# Patient Record
Sex: MALE | Race: BLACK OR AFRICAN AMERICAN | NOT HISPANIC OR LATINO | URBAN - METROPOLITAN AREA
[De-identification: names, ages, dates, MRNs, and addresses within clinical notes are randomized per-mention and may not be internally consistent; named-entity substitution may affect disease eponyms.]

---

## 2020-08-29 ENCOUNTER — EMERGENCY (EMERGENCY)
Facility: HOSPITAL | Age: 30
LOS: 1 days | Discharge: HOME | End: 2020-08-29
Attending: EMERGENCY MEDICINE | Admitting: EMERGENCY MEDICINE
Payer: COMMERCIAL

## 2020-08-29 VITALS
SYSTOLIC BLOOD PRESSURE: 132 MMHG | HEART RATE: 90 BPM | DIASTOLIC BLOOD PRESSURE: 80 MMHG | TEMPERATURE: 98 F | RESPIRATION RATE: 18 BRPM | OXYGEN SATURATION: 97 %

## 2020-08-29 VITALS
HEART RATE: 112 BPM | RESPIRATION RATE: 18 BRPM | DIASTOLIC BLOOD PRESSURE: 86 MMHG | WEIGHT: 315 LBS | SYSTOLIC BLOOD PRESSURE: 147 MMHG | TEMPERATURE: 98 F | OXYGEN SATURATION: 97 %

## 2020-08-29 DIAGNOSIS — Z91.013 ALLERGY TO SEAFOOD: ICD-10-CM

## 2020-08-29 DIAGNOSIS — R07.89 OTHER CHEST PAIN: ICD-10-CM

## 2020-08-29 PROCEDURE — 83880 ASSAY OF NATRIURETIC PEPTIDE: CPT

## 2020-08-29 PROCEDURE — 84484 ASSAY OF TROPONIN QUANT: CPT

## 2020-08-29 PROCEDURE — 85025 COMPLETE CBC W/AUTO DIFF WBC: CPT

## 2020-08-29 PROCEDURE — 36415 COLL VENOUS BLD VENIPUNCTURE: CPT

## 2020-08-29 PROCEDURE — 99283 EMERGENCY DEPT VISIT LOW MDM: CPT | Mod: 25

## 2020-08-29 PROCEDURE — 71045 X-RAY EXAM CHEST 1 VIEW: CPT

## 2020-08-29 PROCEDURE — 71045 X-RAY EXAM CHEST 1 VIEW: CPT | Mod: 26

## 2020-08-29 PROCEDURE — 99285 EMERGENCY DEPT VISIT HI MDM: CPT

## 2020-08-29 PROCEDURE — 80053 COMPREHEN METABOLIC PANEL: CPT

## 2020-08-29 RX ORDER — ASPIRIN/CALCIUM CARB/MAGNESIUM 324 MG
325 TABLET ORAL ONCE
Refills: 0 | Status: COMPLETED | OUTPATIENT
Start: 2020-08-29 | End: 2020-08-29

## 2020-08-29 RX ORDER — NICARDIPINE HYDROCHLORIDE 30 MG/1
30 CAPSULE, EXTENDED RELEASE ORAL ONCE
Refills: 0 | Status: COMPLETED | OUTPATIENT
Start: 2020-08-29 | End: 2020-08-29

## 2020-08-29 RX ADMIN — Medication 325 MILLIGRAM(S): at 18:08

## 2020-08-29 NOTE — ED PROVIDER NOTE - CLINICAL SUMMARY MEDICAL DECISION MAKING FREE TEXT BOX
CP w [facility]- ekg ST, check labs, follow bp and CXR CP w [facility]- ekg ST, check labs, follow bp and CXR  labs noted- slightly bumped trop- recommended admission- but px wants to go home d/w cards- Dr WEIR will see px on MOnday and check echo  si/sx to return to ED d/w px

## 2020-08-29 NOTE — ED ADULT NURSE NOTE - OBJECTIVE STATEMENT
Pt CO CP upon waking this morning with associated AMAYA today.  Pt states "I woke up with a sharp burning pain and I wasn't sure if it was acid pain or not, but I was at the doctors yesterday and my pressure was a little high."  Pt denies any sig PMHx or SHx.  Pt denies N/V/D, Fevers, Dizziness, Cough.

## 2020-08-29 NOTE — ED PROVIDER NOTE - OBJECTIVE STATEMENT
29 M co cp- px awoke with reflux, burning sensation in chest- now when he takes a deep breath feels a sharp pain under his sternum  no exertional pain no assoc sob/n/v/f/c/cough  no sig pmh  brother  of 'enlarged heart' likely HOCM- px had gallegos after brothers death and was told his heart is normal  moderate severity 29 M co cp- px awoke with reflux, burning sensation in chest- now when he takes a deep breath feels a sharp pain under his sternum  olivo- when px walks short distances he becomes out of breath- sx for the past few weeks  no exertional pain no assoc sob/n/v/f/c/cough  no sig pmh  brother  of 'enlarged heart' likely HOCM- px had gallegos after brothers death and was told his heart is normal  moderate severity 29 M co cp- px awoke with reflux, burning sensation in chest- now when he takes a deep breath feels a sharp pain under his sternum  olivo- when px walks short distances he becomes out of breath- sx for the past few months- no change today  pain is not assoc with sob and there is no increase in his chronic sob  no exertional pain no assoc sob/n/v/f/c/cough  no sig pmh  brother  of 'enlarged heart' likely HOCM- px had gallegos after brothers death and was told his heart is normal  moderate severity

## 2020-08-29 NOTE — ED PROVIDER NOTE - PATIENT PORTAL LINK FT
You can access the FollowMyHealth Patient Portal offered by [facility] by registering at the following website: [email or web address]. By joining [employer]’s FollowMyHealth portal, you will also be able to view your health information using other applications (apps) compatible with our system.

## 2020-08-31 PROBLEM — Z00.00 ENCOUNTER FOR PREVENTIVE HEALTH EXAMINATION: Status: ACTIVE | Noted: 2020-08-31

## 2020-09-14 ENCOUNTER — APPOINTMENT (OUTPATIENT)
Dept: INTERNAL MEDICINE | Facility: CLINIC | Age: 30
End: 2020-09-14

## 2020-09-24 ENCOUNTER — APPOINTMENT (OUTPATIENT)
Dept: HEART AND VASCULAR | Facility: CLINIC | Age: 30
End: 2020-09-24
Payer: COMMERCIAL

## 2020-09-24 ENCOUNTER — NON-APPOINTMENT (OUTPATIENT)
Age: 30
End: 2020-09-24

## 2020-09-24 VITALS
SYSTOLIC BLOOD PRESSURE: 135 MMHG | DIASTOLIC BLOOD PRESSURE: 89 MMHG | BODY MASS INDEX: 38.36 KG/M2 | WEIGHT: 315 LBS | OXYGEN SATURATION: 96 % | HEART RATE: 111 BPM | HEIGHT: 76 IN

## 2020-09-24 DIAGNOSIS — R60.0 LOCALIZED EDEMA: ICD-10-CM

## 2020-09-24 DIAGNOSIS — R07.9 CHEST PAIN, UNSPECIFIED: ICD-10-CM

## 2020-09-24 PROCEDURE — 99204 OFFICE O/P NEW MOD 45 MIN: CPT

## 2020-09-26 NOTE — HISTORY OF PRESENT ILLNESS
[FreeTextEntry1] : 31 yo M with morbid obesity here for first evaluation\par Reports chronic AMAYA 2/2 overweight\par Denies chest pain, shortness of breath, palpitations, syncope, presyncope, LE edema, orthopnea. \par Recently went to the ED 2/2 pleuritic chest pain in aug 2020 (reports work up resulted wnl, but no documentation is available). no more episodes since then \par The patient is concerned about h/o sudden cardiac death (brother  at age 27). \par Reports noticing left leg more edematous than right in the last few weeks\par \par PMH\par left eye blindness (congenital)\par pre-DM\par \par PSH\par let eye removal\par right leg \par \par ALL\par shellfish\par \par MEDS\par cyclobenzaprine 10 mg daily \par metformin 500 mg daily \par omeprazole prn\par \par FH\par maternal and paternal grandparents with "heart problems"\par 26 yo brother  of sudden cardiac death in aug 2016\par \par SH\par no smoking, social etoh, MJ occasionally\par \par EKG 2020 SR, wnl

## 2020-09-26 NOTE — ASSESSMENT
[FreeTextEntry1] : 29 yo M with morbid obesity here for first evaluation\par \par AMAYA\par -most likely related  to overweight\par -will obtain echo to r/o any WMA \par \par ASYMMETRICAL LE edema\par u/s lower extremities 2/2 asymmetric edema \par \par ROUTINE CARDIOLOGY VISIT\par -obtain fasting lipid panel\par -CMP wnl\par -BP wnl off meds \par -recc f/u with PMD for pre DM control\par -echo to r/o any WMA in setting of h/o sudden cardiac death in family\par \par f/u in 1 month

## 2020-09-26 NOTE — PHYSICAL EXAM
[Normal Oral Mucosa] : normal oral mucosa [No Oral Pallor] : no oral pallor [No Oral Cyanosis] : no oral cyanosis [Normal Jugular Venous A Waves Present] : normal jugular venous A waves present [Normal Jugular Venous V Waves Present] : normal jugular venous V waves present [No Jugular Venous Chandler A Waves] : no jugular venous chandler A waves [Heart Rate And Rhythm] : heart rate and rhythm were normal [Heart Sounds] : normal S1 and S2 [Murmurs] : no murmurs present [Respiration, Rhythm And Depth] : normal respiratory rhythm and effort [Exaggerated Use Of Accessory Muscles For Inspiration] : no accessory muscle use [Auscultation Breath Sounds / Voice Sounds] : lungs were clear to auscultation bilaterally [Abdomen Soft] : soft [Abdomen Tenderness] : non-tender [Abdomen Mass (___ Cm)] : no abdominal mass palpated [Nail Clubbing] : no clubbing of the fingernails [Cyanosis, Localized] : no localized cyanosis [Petechial Hemorrhages (___cm)] : no petechial hemorrhages [] : no ischemic changes [FreeTextEntry1] : left leg> edematous right

## 2020-10-22 ENCOUNTER — APPOINTMENT (OUTPATIENT)
Dept: HEART AND VASCULAR | Facility: CLINIC | Age: 30
End: 2020-10-22

## 2020-10-25 ENCOUNTER — APPOINTMENT (OUTPATIENT)
Dept: ULTRASOUND IMAGING | Facility: HOSPITAL | Age: 30
End: 2020-10-25
Payer: COMMERCIAL

## 2020-10-25 ENCOUNTER — OUTPATIENT (OUTPATIENT)
Dept: OUTPATIENT SERVICES | Facility: HOSPITAL | Age: 30
LOS: 1 days | End: 2020-10-25
Payer: COMMERCIAL

## 2020-10-25 PROCEDURE — 93970 EXTREMITY STUDY: CPT

## 2020-10-25 PROCEDURE — 93970 EXTREMITY STUDY: CPT | Mod: 26

## 2020-11-19 ENCOUNTER — OUTPATIENT (OUTPATIENT)
Dept: OUTPATIENT SERVICES | Facility: HOSPITAL | Age: 30
LOS: 1 days | End: 2020-11-19
Payer: COMMERCIAL

## 2020-11-19 DIAGNOSIS — R07.9 CHEST PAIN, UNSPECIFIED: ICD-10-CM

## 2020-11-19 PROCEDURE — 93306 TTE W/DOPPLER COMPLETE: CPT | Mod: 26

## 2020-11-19 PROCEDURE — 93306 TTE W/DOPPLER COMPLETE: CPT

## 2020-12-03 ENCOUNTER — APPOINTMENT (OUTPATIENT)
Dept: HEART AND VASCULAR | Facility: CLINIC | Age: 30
End: 2020-12-03
Payer: COMMERCIAL

## 2020-12-03 VITALS
DIASTOLIC BLOOD PRESSURE: 87 MMHG | BODY MASS INDEX: 58.67 KG/M2 | HEART RATE: 102 BPM | WEIGHT: 315 LBS | SYSTOLIC BLOOD PRESSURE: 138 MMHG | OXYGEN SATURATION: 96 %

## 2020-12-03 PROCEDURE — 99213 OFFICE O/P EST LOW 20 MIN: CPT

## 2020-12-03 PROCEDURE — 99072 ADDL SUPL MATRL&STAF TM PHE: CPT

## 2020-12-03 NOTE — HISTORY OF PRESENT ILLNESS
[FreeTextEntry1] : 31 yo M with morbid obesity here for follow up evaluation and obtain echo and duplex results. \par Reports chronic AMAYA 2/2 overweight\par Denies chest pain, shortness of breath, palpitations, syncope, presyncope, LE edema, orthopnea. \par Reports asymmetrical LE edema has improved.  \par \par \par PMH\par left eye blindness (congenital)\par pre-DM\par \par PSH\par let eye removal\par right leg \par \par ALL\par shellfish\par \par MEDS\par lexapri \par metformin 500 mg daily \par omeprazole prn\par \par FH\par maternal and paternal grandparents with "heart problems"\par 26 yo brother  of sudden cardiac death in aug 2016\par \par SH\par no smoking, social etoh, MJ occasionally\par \par EKG 2020 SR, wnl\par \par ECHO 2020\par normal left and right ventricular size and systolic function\par no significant valvular disease \par no pHTN\par \par DUPLEX lower extremities: on DVT in b/l lower extremities \par \par \par

## 2020-12-03 NOTE — ASSESSMENT
[FreeTextEntry1] : 31 yo M with morbid obesity here for first evaluation\par \par AMAYA\par -normal echo, normal EKG\par -etiology is likely multifactorial with morbid obesity playing a significant role\par -recc considering bariatric surgery\par -recc f/u with PMD\par \par ASYMMETRICAL LE edema\par -resolved\par -u/s lower extremities with no DVT\par -cont to monitor \par \par HTN\par -upper limit of normal\par -recc to monitor, life style echanges \par -explained the importance of weigh loss, diet and exercise\par \par ROUTINE CARDIOLOGY VISIT\par -recc obtain fasting lipid panel\par -CMP wnl\par -BP wnl off meds \par -recc f/u with PMD for pre DM control\par -echo with normal size and function (recc obtaining details on h/o sudden cardiac death in brother )\par \par f/u in 6 months or sooner if any symptoms

## 2021-06-24 ENCOUNTER — APPOINTMENT (OUTPATIENT)
Dept: HEART AND VASCULAR | Facility: CLINIC | Age: 31
End: 2021-06-24

## 2022-06-30 NOTE — ED ADULT NURSE NOTE - CAS EDN DISCHARGE INTERVENTIONS

## 2024-01-07 ENCOUNTER — EMERGENCY (EMERGENCY)
Facility: HOSPITAL | Age: 34
LOS: 1 days | Discharge: HOME | End: 2024-01-07
Attending: EMERGENCY MEDICINE | Admitting: EMERGENCY MEDICINE
Payer: COMMERCIAL

## 2024-01-07 VITALS
SYSTOLIC BLOOD PRESSURE: 179 MMHG | OXYGEN SATURATION: 97 % | HEART RATE: 96 BPM | RESPIRATION RATE: 18 BRPM | TEMPERATURE: 98 F | DIASTOLIC BLOOD PRESSURE: 79 MMHG

## 2024-01-07 VITALS
SYSTOLIC BLOOD PRESSURE: 140 MMHG | TEMPERATURE: 98 F | OXYGEN SATURATION: 95 % | DIASTOLIC BLOOD PRESSURE: 73 MMHG | WEIGHT: 315 LBS | HEIGHT: 76 IN | RESPIRATION RATE: 18 BRPM | HEART RATE: 100 BPM

## 2024-01-07 LAB
FLUAV AG NPH QL: SIGNIFICANT CHANGE UP
FLUAV AG NPH QL: SIGNIFICANT CHANGE UP
FLUBV AG NPH QL: SIGNIFICANT CHANGE UP
FLUBV AG NPH QL: SIGNIFICANT CHANGE UP
RSV RNA NPH QL NAA+NON-PROBE: SIGNIFICANT CHANGE UP
RSV RNA NPH QL NAA+NON-PROBE: SIGNIFICANT CHANGE UP
SARS-COV-2 RNA SPEC QL NAA+PROBE: SIGNIFICANT CHANGE UP
SARS-COV-2 RNA SPEC QL NAA+PROBE: SIGNIFICANT CHANGE UP

## 2024-01-07 PROCEDURE — 99285 EMERGENCY DEPT VISIT HI MDM: CPT | Mod: 25

## 2024-01-07 PROCEDURE — 87637 SARSCOV2&INF A&B&RSV AMP PRB: CPT

## 2024-01-07 PROCEDURE — 96372 THER/PROPH/DIAG INJ SC/IM: CPT

## 2024-01-07 PROCEDURE — 93970 EXTREMITY STUDY: CPT

## 2024-01-07 PROCEDURE — 71046 X-RAY EXAM CHEST 2 VIEWS: CPT

## 2024-01-07 PROCEDURE — 99285 EMERGENCY DEPT VISIT HI MDM: CPT

## 2024-01-07 PROCEDURE — 93970 EXTREMITY STUDY: CPT | Mod: 26

## 2024-01-07 PROCEDURE — 71046 X-RAY EXAM CHEST 2 VIEWS: CPT | Mod: 26

## 2024-01-07 RX ORDER — KETOROLAC TROMETHAMINE 30 MG/ML
15 SYRINGE (ML) INJECTION ONCE
Refills: 0 | Status: DISCONTINUED | OUTPATIENT
Start: 2024-01-07 | End: 2024-01-07

## 2024-01-07 RX ORDER — LIDOCAINE 4 G/100G
1 CREAM TOPICAL ONCE
Refills: 0 | Status: COMPLETED | OUTPATIENT
Start: 2024-01-07 | End: 2024-01-07

## 2024-01-07 RX ADMIN — Medication 15 MILLIGRAM(S): at 02:31

## 2024-01-07 RX ADMIN — LIDOCAINE 1 PATCH: 4 CREAM TOPICAL at 02:30

## 2024-01-07 RX ADMIN — Medication 100 MILLIGRAM(S): at 02:31

## 2024-01-07 NOTE — ED PROVIDER NOTE - PATIENT PORTAL LINK FT
You can access the FollowMyHealth Patient Portal offered by [facility] by registering at the following website: [email or web address]. By joining [employer]’s FollowMyHealth portal, you will also be able to view your health information using other applications (apps) compatible with our system. You can access the FollowMyHealth Patient Portal offered by [facility] by registering at the following website: [email or web address]. By joining [employer]’s FollowMyHealth portal, you will also be able to view your health information using other applications (apps) compatible with our system.

## 2024-01-07 NOTE — ED PROVIDER NOTE - PROGRESS NOTE DETAILS
Ileana: Received s/o pending  US. US w/ no DVT. Pt requesting cough medicine. Remains well appearing. Discussed importance of outpt follow up and return precautions. Clinically no indication for further emergent ED workup or hospitalization at this time. Stable for dc, outpt f/u.

## 2024-01-07 NOTE — ED PROVIDER NOTE - CLINICAL SUMMARY MEDICAL DECISION MAKING FREE TEXT BOX
104.7 VS unremarkable here  Exam w/ decreased BS throughout 2/2 body habitus  CXR grossly unchanged from prior  pt w/ one spot mildly ttp at L lower ribcage, feels like a knot  discussed w/ pt and mother symptoms likely 2/2 muscle spasm in setting of coughing fits  pt says he wants to make sure he has no blood clot, I explained he does not have risk factors or symptoms of a PE and his clinical presentation is not consistent w/ it and does not warrant a PE workup. Pt was highly insistent, said yesterday he felt a spasm in his leg that resolved. Compromise will get b/l LE duplex and if no DVTs, pt will be dc to f/u outpt w/ PCP. If there is a DVT, will pursure PE workup and get CTA.

## 2024-01-07 NOTE — ED ADULT NURSE NOTE - NSFALLUNIVINTERV_ED_ALL_ED
Bed/Stretcher in lowest position, wheels locked, appropriate side rails in place/Call bell, personal items and telephone in reach/Instruct patient to call for assistance before getting out of bed/chair/stretcher/Non-slip footwear applied when patient is off stretcher/[place] to call system/Physically safe environment - no spills, clutter or unnecessary equipment/Purposeful proactive rounding/Room/bathroom lighting operational, light cord in reach Bed/Stretcher in lowest position, wheels locked, appropriate side rails in place/Call bell, personal items and telephone in reach/Instruct patient to call for assistance before getting out of bed/chair/stretcher/Non-slip footwear applied when patient is off stretcher/[place] to call system/Physically safe environment - no spills, clutter or unnecessary equipment/Purposeful proactive rounding/Room/bathroom lighting operational, light cord in reach

## 2024-01-07 NOTE — ED PROVIDER NOTE - OBJECTIVE STATEMENT
33yM w/ morbid obesity comes in for 1 week of rhinorrhea, congestion and cough. Says he thinks he probably has a virus, has been coughing a lot. Tonight started having pain to a particular spot at the L lower ribcage worse when he presses on it. No chest pain or SOB. No fevers. No recent fall or trauma  Pt has family hx of HOCM, was evaluated by cardiology in 2020 and had echo that was normal no HOCM.  Pt says he is highly concerned that he has a blood clot bc he has family hx of blood clots. Pt denies recent surgery, recent travel, smoking. No leg pain or swelling.

## 2024-01-07 NOTE — ED PROVIDER NOTE - PHYSICAL EXAMINATION
CONST: nontoxic NAD speaking in full sentences  HEAD: atraumatic  EYES: R conjunctivae clear, L eye shut  NECK: supple  CARD: regular rate  CHEST: breathing comfortably, no stridor/retractions/tripoding, decreased throughout 2/2 body habitus  EXT: no asymmetry  SKIN: warm, dry  NEURO: awake alert answering questions following commands moving all extremities

## 2024-01-07 NOTE — ED ADULT NURSE NOTE - OBJECTIVE STATEMENT
33 y.o. male presents to the ER with c/o productive cough for weeks associated with right sided rib pain and runny nose. Patient denies any fevers, chill, SOB, chest pain, dyspnea, N/V/D, urinary symptoms. Patient reports history of HTN, family history of PE. Patient is AAOx4, breathing spontaneously, trach is midline, chest is symmetrical, breathing is even and unlabored.

## 2024-01-07 NOTE — ED PROVIDER NOTE - NSFOLLOWUPINSTRUCTIONS_ED_ALL_ED_FT
Cough    Coughing is a reflex that clears your throat and your airways. Coughing helps to heal and protect your lungs. It is normal to cough occasionally, but a cough that happens with other symptoms or lasts a long time may be a sign of a condition that needs treatment. Coughing may be caused by infections, asthma or COPD, smoking, postnasal drip, gastroesophageal reflux, as well as other medical conditions. Take medicines only as instructed by your health care provider. Avoid environments or triggers that causes you to cough at work or at home.    SEEK IMMEDIATE MEDICAL CARE IF YOU HAVE ANY OF THE FOLLOWING SYMPTOMS: coughing up blood, shortness of breath, rapid heart rate, chest pain, unexplained weight loss or night sweats. It is unclear what exactly is causing your symptoms! You may have viral cough. It is important to continue following up with your doctor outside the hospital and to return to ER for: Persistent fever/vomiting, uncontrolled pain, worsening swelling, worsening breathing, worsening lightheaded, spreading redness.     Can take tylenol 650mg or motrin 600mg (May cause stomach irritation - take with food and avoid prolonged use) every 6hrs as needed for pain or fever.    Stay well hydrated.    Follow up with primary doctor within 1-2 days.     Can take cough medicine as prescribed.       Cough    Coughing is a reflex that clears your throat and your airways. Coughing helps to heal and protect your lungs. It is normal to cough occasionally, but a cough that happens with other symptoms or lasts a long time may be a sign of a condition that needs treatment. Coughing may be caused by infections, asthma or COPD, smoking, postnasal drip, gastroesophageal reflux, as well as other medical conditions. Take medicines only as instructed by your health care provider. Avoid environments or triggers that causes you to cough at work or at home.    SEEK IMMEDIATE MEDICAL CARE IF YOU HAVE ANY OF THE FOLLOWING SYMPTOMS: coughing up blood, shortness of breath, rapid heart rate, chest pain, unexplained weight loss or night sweats.

## 2024-01-08 ENCOUNTER — TRANSCRIPTION ENCOUNTER (OUTPATIENT)
Age: 34
End: 2024-01-08

## 2024-01-09 DIAGNOSIS — J34.89 OTHER SPECIFIED DISORDERS OF NOSE AND NASAL SINUSES: ICD-10-CM

## 2024-01-09 DIAGNOSIS — Z91.013 ALLERGY TO SEAFOOD: ICD-10-CM

## 2024-01-09 DIAGNOSIS — Z20.822 CONTACT WITH AND (SUSPECTED) EXPOSURE TO COVID-19: ICD-10-CM

## 2024-01-09 DIAGNOSIS — R09.81 NASAL CONGESTION: ICD-10-CM

## 2024-01-09 DIAGNOSIS — R05.9 COUGH, UNSPECIFIED: ICD-10-CM

## 2024-01-09 DIAGNOSIS — R07.81 PLEURODYNIA: ICD-10-CM

## 2024-01-09 DIAGNOSIS — Z83.2 FAMILY HISTORY OF DISEASES OF THE BLOOD AND BLOOD-FORMING ORGANS AND CERTAIN DISORDERS INVOLVING THE IMMUNE MECHANISM: ICD-10-CM

## 2024-04-08 NOTE — ED ADULT NURSE NOTE - BREATH SOUNDS RLL
Detail Level: Detailed Quality 226: Preventive Care And Screening: Tobacco Use: Screening And Cessation Intervention: Patient screened for tobacco use and is an ex/non-smoker Quality 130: Documentation Of Current Medications In The Medical Record: Current Medications Documented Quality 431: Preventive Care And Screening: Unhealthy Alcohol Use - Screening: Patient not identified as an unhealthy alcohol user when screened for unhealthy alcohol use using a systematic screening method clear

## 2025-08-18 ENCOUNTER — EMERGENCY (EMERGENCY)
Facility: HOSPITAL | Age: 35
LOS: 1 days | End: 2025-08-18
Attending: EMERGENCY MEDICINE | Admitting: EMERGENCY MEDICINE
Payer: COMMERCIAL

## 2025-08-18 VITALS
HEART RATE: 80 BPM | TEMPERATURE: 99 F | DIASTOLIC BLOOD PRESSURE: 85 MMHG | HEIGHT: 76 IN | RESPIRATION RATE: 18 BRPM | SYSTOLIC BLOOD PRESSURE: 138 MMHG | WEIGHT: 315 LBS | OXYGEN SATURATION: 96 %

## 2025-08-18 VITALS
OXYGEN SATURATION: 98 % | TEMPERATURE: 98 F | HEART RATE: 83 BPM | DIASTOLIC BLOOD PRESSURE: 85 MMHG | SYSTOLIC BLOOD PRESSURE: 127 MMHG | RESPIRATION RATE: 17 BRPM

## 2025-08-18 LAB
FLUAV AG NPH QL: SIGNIFICANT CHANGE UP
FLUBV AG NPH QL: SIGNIFICANT CHANGE UP
RSV RNA NPH QL NAA+NON-PROBE: SIGNIFICANT CHANGE UP
SARS-COV-2 RNA SPEC QL NAA+PROBE: SIGNIFICANT CHANGE UP
SOURCE RESPIRATORY: SIGNIFICANT CHANGE UP

## 2025-08-18 PROCEDURE — 71046 X-RAY EXAM CHEST 2 VIEWS: CPT | Mod: 26

## 2025-08-18 PROCEDURE — 99284 EMERGENCY DEPT VISIT MOD MDM: CPT

## 2025-08-18 PROCEDURE — 99283 EMERGENCY DEPT VISIT LOW MDM: CPT | Mod: 25

## 2025-08-18 PROCEDURE — 87637 SARSCOV2&INF A&B&RSV AMP PRB: CPT

## 2025-08-18 PROCEDURE — 71046 X-RAY EXAM CHEST 2 VIEWS: CPT

## 2025-08-19 PROBLEM — Z78.9 OTHER SPECIFIED HEALTH STATUS: Chronic | Status: ACTIVE | Noted: 2024-01-07

## 2025-08-21 DIAGNOSIS — R06.02 SHORTNESS OF BREATH: ICD-10-CM

## 2025-08-21 DIAGNOSIS — J06.9 ACUTE UPPER RESPIRATORY INFECTION, UNSPECIFIED: ICD-10-CM
